# Patient Record
Sex: FEMALE
[De-identification: names, ages, dates, MRNs, and addresses within clinical notes are randomized per-mention and may not be internally consistent; named-entity substitution may affect disease eponyms.]

---

## 2024-05-10 PROBLEM — Z00.00 ENCOUNTER FOR PREVENTIVE HEALTH EXAMINATION: Status: ACTIVE | Noted: 2024-05-10

## 2024-05-13 ENCOUNTER — APPOINTMENT (OUTPATIENT)
Dept: ORTHOPEDIC SURGERY | Facility: CLINIC | Age: 29
End: 2024-05-13
Payer: COMMERCIAL

## 2024-05-13 VITALS — HEIGHT: 67 IN | WEIGHT: 196 LBS | BODY MASS INDEX: 30.76 KG/M2

## 2024-05-13 DIAGNOSIS — F41.9 ANXIETY DISORDER, UNSPECIFIED: ICD-10-CM

## 2024-05-13 DIAGNOSIS — Z78.9 OTHER SPECIFIED HEALTH STATUS: ICD-10-CM

## 2024-05-13 DIAGNOSIS — M67.431 GANGLION, RIGHT WRIST: ICD-10-CM

## 2024-05-13 PROCEDURE — 73110 X-RAY EXAM OF WRIST: CPT | Mod: RT

## 2024-05-13 PROCEDURE — 99203 OFFICE O/P NEW LOW 30 MIN: CPT

## 2024-05-13 RX ORDER — ESCITALOPRAM OXALATE 20 MG/1
20 TABLET, FILM COATED ORAL
Refills: 0 | Status: ACTIVE | COMMUNITY

## 2024-05-13 NOTE — HISTORY OF PRESENT ILLNESS
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 Denies injury. Reports h/o surgery (excision of dorsal wrist ganglion cyst + ?PIN neurectomy) by Dr. Avinash Raza on 2/27/23 in [place].  Reports recurrence of pain in August 2023. Also noticed numbness of dorsal index finger that radiates to ulnar aspect of index finger with finger flexion/extension around that time. Reports CSI x 2 after surgery, last one in December 2023, which decreased but did not resolve the pain. Started PT in December 2023, attended 3 sessions, last session ~1 month ago. Reports PT improved ROM and strength. Patient recently moved to [place]. c/o pain with loading palm, unable to do yoga.  Hx: Anxiety.  [FreeTextEntry1] : RIGHT wrist  [FreeTextEntry5] : RADHA OKEEFEJOE lee [RHD] 28 year old female is here today c/o RIGHT wrist pain and cyst x 1.5 years w/o injury. pt had initial cyst removed on 02/27/23 by Dr. Marek Raza in [place]. she had PT 3x since DOS. pain and cyst increasing 1 month ago. c/o tightness around wrist and dorsal wrist (site of cyst).  [de-identified] : Dr. Raza  [de-identified] : 02/27/23

## 2024-05-13 NOTE — IMAGING
[de-identified] : RIGHT HAND scar over dorsal wrist with cyst palpable just ulnar to scar. + TTP. min surrounding swelling. skin intact. wrist ROM: good extension, flexion. good pronation, supination. good EPL, FPL. good finger extension, flex to full fist. good finger abduction and adduction.  SILT to median, ulnar, radial distribution.  palpable radial pulse, brisk cap refill all digits. no triggering.  Fitzpatrick's maneuver => unable to tolerate due to pain.   XRAYS OF RIGHT WRIST: no acute displaced fracture or dislocation.

## 2024-05-13 NOTE — ASSESSMENT
[FreeTextEntry1] : The condition was explained to the patient - recurrent RIGHT dorsal wrist ganglion cyst s/p excision. Discussed risks and benefits of treatment options for ganglion cyst - observation, NSAID, brace, aspiration (50% chance of recurrence), or surgery (10% chance of recurrence). Patient would like to proceed with aspiration. - prescribed US guided aspiration of recurrent RIGHT dorsal wrist ganglion cyst. - prescribed OT for R wrist, to start ~1 week after aspiration. - recommend wrist brace, pain guided activity modification as needed.  F/u PRN. Patient has requested records from prior surgeon.

## 2024-05-31 ENCOUNTER — NON-APPOINTMENT (OUTPATIENT)
Age: 29
End: 2024-05-31